# Patient Record
Sex: FEMALE | Race: WHITE | Employment: OTHER | URBAN - METROPOLITAN AREA
[De-identification: names, ages, dates, MRNs, and addresses within clinical notes are randomized per-mention and may not be internally consistent; named-entity substitution may affect disease eponyms.]

---

## 2017-12-01 ENCOUNTER — TRANSFERRED RECORDS (OUTPATIENT)
Dept: HEALTH INFORMATION MANAGEMENT | Facility: CLINIC | Age: 67
End: 2017-12-01

## 2018-06-13 ENCOUNTER — OFFICE VISIT (OUTPATIENT)
Dept: INTERNAL MEDICINE | Facility: CLINIC | Age: 68
End: 2018-06-13
Payer: MEDICARE

## 2018-06-13 VITALS
RESPIRATION RATE: 16 BRPM | HEART RATE: 86 BPM | WEIGHT: 217.9 LBS | HEIGHT: 60 IN | DIASTOLIC BLOOD PRESSURE: 76 MMHG | OXYGEN SATURATION: 96 % | BODY MASS INDEX: 42.78 KG/M2 | TEMPERATURE: 98.4 F | SYSTOLIC BLOOD PRESSURE: 104 MMHG

## 2018-06-13 DIAGNOSIS — I10 ESSENTIAL HYPERTENSION: Primary | ICD-10-CM

## 2018-06-13 LAB
ERYTHROCYTE [DISTWIDTH] IN BLOOD BY AUTOMATED COUNT: 13.5 % (ref 10–15)
HCT VFR BLD AUTO: 38.2 % (ref 35–47)
HGB BLD-MCNC: 12.4 G/DL (ref 11.7–15.7)
MCH RBC QN AUTO: 31 PG (ref 26.5–33)
MCHC RBC AUTO-ENTMCNC: 32.5 G/DL (ref 31.5–36.5)
MCV RBC AUTO: 96 FL (ref 78–100)
PLATELET # BLD AUTO: 258 10E9/L (ref 150–450)
RBC # BLD AUTO: 4 10E12/L (ref 3.8–5.2)
WBC # BLD AUTO: 6.9 10E9/L (ref 4–11)

## 2018-06-13 PROCEDURE — 85027 COMPLETE CBC AUTOMATED: CPT | Performed by: NURSE PRACTITIONER

## 2018-06-13 PROCEDURE — 82043 UR ALBUMIN QUANTITATIVE: CPT | Performed by: NURSE PRACTITIONER

## 2018-06-13 PROCEDURE — 80048 BASIC METABOLIC PNL TOTAL CA: CPT | Performed by: NURSE PRACTITIONER

## 2018-06-13 PROCEDURE — 80061 LIPID PANEL: CPT | Performed by: NURSE PRACTITIONER

## 2018-06-13 PROCEDURE — 99203 OFFICE O/P NEW LOW 30 MIN: CPT | Performed by: NURSE PRACTITIONER

## 2018-06-13 PROCEDURE — 36415 COLL VENOUS BLD VENIPUNCTURE: CPT | Performed by: NURSE PRACTITIONER

## 2018-06-13 PROCEDURE — 84460 ALANINE AMINO (ALT) (SGPT): CPT | Performed by: NURSE PRACTITIONER

## 2018-06-13 RX ORDER — MULTIPLE VITAMINS W/ MINERALS TAB 9MG-400MCG
1 TAB ORAL DAILY
COMMUNITY

## 2018-06-13 RX ORDER — POTASSIUM CHLORIDE 750 MG/1
10 TABLET, EXTENDED RELEASE ORAL DAILY
COMMUNITY

## 2018-06-13 RX ORDER — VALSARTAN AND HYDROCHLOROTHIAZIDE 160; 12.5 MG/1; MG/1
1 TABLET, FILM COATED ORAL 2 TIMES DAILY
Qty: 180 TABLET | Refills: 3 | Status: SHIPPED | OUTPATIENT
Start: 2018-06-13 | End: 2018-06-21

## 2018-06-13 RX ORDER — MONTELUKAST SODIUM 10 MG/1
10 TABLET ORAL DAILY
COMMUNITY
End: 2019-01-17

## 2018-06-13 RX ORDER — CHOLECALCIFEROL (VITAMIN D3) 25 MCG
2000 CAPSULE ORAL DAILY
COMMUNITY

## 2018-06-13 RX ORDER — METOPROLOL TARTRATE 50 MG
50 TABLET ORAL 2 TIMES DAILY
Qty: 180 TABLET | Refills: 3 | Status: SHIPPED | OUTPATIENT
Start: 2018-06-13 | End: 2019-01-17

## 2018-06-13 RX ORDER — CHLORAL HYDRATE 500 MG
2 CAPSULE ORAL DAILY
COMMUNITY

## 2018-06-13 RX ORDER — VALSARTAN AND HYDROCHLOROTHIAZIDE 160; 12.5 MG/1; MG/1
1 TABLET, FILM COATED ORAL 2 TIMES DAILY
COMMUNITY
End: 2018-06-13

## 2018-06-13 NOTE — LETTER
June 14, 2018      Yaz Rendon  [id number] [address]        Dear ,    We are writing to inform you of your test results.    Your cholesterol is mildly elevated. I would like you to work harder on your diet for now. You will need a follow up fasting cholesterol in 6-12 months. The rest of your results are in normal range.     Resulted Orders   CBC with platelets   Result Value Ref Range    WBC 6.9 4.0 - 11.0 10e9/L    RBC Count 4.00 3.8 - 5.2 10e12/L    Hemoglobin 12.4 11.7 - 15.7 g/dL    Hematocrit 38.2 35.0 - 47.0 %    MCV 96 78 - 100 fl    MCH 31.0 26.5 - 33.0 pg    MCHC 32.5 31.5 - 36.5 g/dL    RDW 13.5 10.0 - 15.0 %    Platelet Count 258 150 - 450 10e9/L   Basic metabolic panel   Result Value Ref Range    Sodium 143 133 - 144 mmol/L    Potassium 4.3 3.4 - 5.3 mmol/L    Chloride 106 94 - 109 mmol/L    Carbon Dioxide 27 20 - 32 mmol/L    Anion Gap 10 3 - 14 mmol/L    Glucose 89 70 - 99 mg/dL      Comment:      Fasting specimen    Urea Nitrogen 19 7 - 30 mg/dL    Creatinine 0.69 0.52 - 1.04 mg/dL    GFR Estimate 84 >60 mL/min/1.7m2      Comment:      Non  GFR Calc    GFR Estimate If Black >90 >60 mL/min/1.7m2      Comment:       GFR Calc    Calcium 9.4 8.5 - 10.1 mg/dL   ALT   Result Value Ref Range    ALT 26 0 - 50 U/L   Lipid Profile   Result Value Ref Range    Cholesterol 192 <200 mg/dL    Triglycerides 93 <150 mg/dL      Comment:      Fasting specimen    HDL Cholesterol 55 >49 mg/dL    LDL Cholesterol Calculated 118 (H) <100 mg/dL      Comment:      Above desirable:  100-129 mg/dl  Borderline High:  130-159 mg/dL  High:             160-189 mg/dL  Very high:       >189 mg/dl      Non HDL Cholesterol 137 (H) <130 mg/dL      Comment:      Above Desirable:  130-159 mg/dl  Borderline high:  160-189 mg/dl  High:             190-219 mg/dl  Very high:       >219 mg/dl         If you have any questions or concerns, please call the clinic at the  number listed above.       Sincerely,        Wendy Hicks NP

## 2018-06-13 NOTE — Clinical Note
Please abstract the following data from this visit with this patient into the appropriate field in Epic:  Colonoscopy done on this date: 02/01/17 (approximately), by this group: [place], results were normal.  Mammogram done on this date: 12/01/17 (approximately), by this group: [place], results were normal.  Dexa done in [place] and were normal.

## 2018-06-13 NOTE — MR AVS SNAPSHOT
"              After Visit Summary   2018    MS Yaz Rendon    MRN: [id number]           Patient Information     Date Of Birth          1950        Visit Information        Provider Department      2018 8:40 AM Wendy Hicks NP [facility]        Today's Diagnoses     Essential hypertension    -  1       Follow-ups after your visit        Who to contact     If you have questions or need follow up information about today's clinic visit or your schedule please contact [facility] directly at [phone].  Normal or non-critical lab and imaging results will be communicated to you by [employer], letter or phone within 4 business days after the clinic has received the results. If you do not hear from us within 7 days, please contact the clinic through [employer] or phone. If you have a critical or abnormal lab result, we will notify you by phone as soon as possible.  Submit refill requests through [employer] or call your pharmacy and they will forward the refill request to us. Please allow 3 business days for your refill to be completed.          Additional Information About Your Visit        MyChart Information     [employer] lets you send messages to your doctor, view your test results, renew your prescriptions, schedule appointments and more. To sign up, go to [email or web address] . Click on \"Log in\" on the left side of the screen, which will take you to the Welcome page. Then click on \"Sign up Now\" on the right side of the page.     You will be asked to enter the access code listed below, as well as some personal information. Please follow the directions to create your username and password.     Your access code is: [email or web address]  Expires: 2018  8:05 AM     Your access code will  in 90 days. If you need help or a new code, please call your [facility] or [phone].        Care EveryWhere ID     This is your Care EveryWhere ID. This could be used " by other organizations to access your [place] medical records  [id number]        Your Vitals Were     Pulse Temperature Respirations Height Pulse Oximetry BMI (Body Mass Index)    86 98.4  F (36.9  C) (Oral) 16 5' (1.524 m) 96% 42.56 kg/m2       Blood Pressure from Last 3 Encounters:   06/13/18 104/76    Weight from Last 3 Encounters:   06/13/18 217 lb 14.4 oz (98.8 kg)              We Performed the Following     Albumin Random Urine Quantitative with Creat Ratio     ALT     Basic metabolic panel     CBC with platelets     Lipid Profile          Today's Medication Changes          These changes are accurate as of 6/13/18  9:58 AM.  If you have any questions, ask your nurse or doctor.               Start taking these medicines.        Dose/Directions    metoprolol tartrate 50 MG tablet   Commonly known as:  LOPRESSOR   Used for:  Essential hypertension   Started by:  Wendy Hicks NP        Dose:  50 mg   Take 1 tablet (50 mg) by mouth 2 times daily   Quantity:  180 tablet   Refills:  3            Where to get your medicines      These medications were sent to [place] [address] [place] [address] [address]     Phone:  [phone]     metoprolol tartrate 50 MG tablet    valsartan-hydrochlorothiazide 160-12.5 MG per tablet                Primary Care Provider Office Phone # Fax #    Wendy Hicks [phone]       [address] [facility] [address]        Equal Access to Services     JOSELIN ALMONTE AH: Hadii aad ku hadasho Soomaali, waaxda luqadaha, qaybta kaalmada adeegyada, wax donte yeung adematias lopes. So [facility] [phone].    ATENCIÓN: Si habla español, tiene a blanchard disposición servicios gratuitos de asistencia lingüística. Lldanny al [phone].    We comply with applicable federal civil rights laws and [place] laws. We do not discriminate on the basis of race, color, national origin, age, disability,  sex, sexual orientation, or gender identity.            Thank you!     Thank you for choosing [facility]  for your care. Our goal is always to provide you with excellent care. Hearing back from our patients is one way we can continue to improve our services. Please take a few minutes to complete the written survey that you may receive in the mail after your visit with us. Thank you!             Your Updated Medication List - Protect others around you: Learn how to safely use, store and throw away your medicines at www.disposemymeds.org.          This list is accurate as of 6/13/18  9:58 AM.  Always use your most recent med list.                   Brand Name Dispense Instructions for use Diagnosis    aspirin 81 MG tablet      Take 81 mg by mouth daily        fish oil-omega-3 fatty acids 1000 MG capsule      Take 2 g by mouth daily        fluticasone 50 MCG/BLIST Aepb    FLOVENT DISKUS     Inhale 1 puff into the lungs daily        HYDROCHLOROTHIAZIDE PO      Take 25 mg by mouth daily        metoprolol tartrate 50 MG tablet    LOPRESSOR    180 tablet    Take 1 tablet (50 mg) by mouth 2 times daily    Essential hypertension       montelukast 10 MG tablet    SINGULAIR     Take 10 mg by mouth daily        Multi-vitamin Tabs tablet      Take 1 tablet by mouth daily        potassium chloride 10 MEQ tablet    K-TAB,KLOR-CON     Take 10 mEq by mouth daily        valsartan-hydrochlorothiazide 160-12.5 MG per tablet    DIOVAN-HCT    180 tablet    Take 1 tablet by mouth 2 times daily    Essential hypertension       Vitamin D-3 1000 units Caps      Take 2,000 Units by mouth daily

## 2018-06-14 LAB
ALT SERPL W P-5'-P-CCNC: 26 U/L (ref 0–50)
ANION GAP SERPL CALCULATED.3IONS-SCNC: 10 MMOL/L (ref 3–14)
BUN SERPL-MCNC: 19 MG/DL (ref 7–30)
CALCIUM SERPL-MCNC: 9.4 MG/DL (ref 8.5–10.1)
CHLORIDE SERPL-SCNC: 106 MMOL/L (ref 94–109)
CHOLEST SERPL-MCNC: 192 MG/DL
CO2 SERPL-SCNC: 27 MMOL/L (ref 20–32)
CREAT SERPL-MCNC: 0.69 MG/DL (ref 0.52–1.04)
GFR SERPL CREATININE-BSD FRML MDRD: 84 ML/MIN/1.7M2
GLUCOSE SERPL-MCNC: 89 MG/DL (ref 70–99)
HDLC SERPL-MCNC: 55 MG/DL
LDLC SERPL CALC-MCNC: 118 MG/DL
NONHDLC SERPL-MCNC: 137 MG/DL
POTASSIUM SERPL-SCNC: 4.3 MMOL/L (ref 3.4–5.3)
SODIUM SERPL-SCNC: 143 MMOL/L (ref 133–144)
TRIGL SERPL-MCNC: 93 MG/DL

## 2018-06-15 LAB
CREAT UR-MCNC: 231 MG/DL
MICROALBUMIN UR-MCNC: 17 MG/L
MICROALBUMIN/CREAT UR: 7.32 MG/G CR (ref 0–25)

## 2018-06-19 ENCOUNTER — DOCUMENTATION ONLY (OUTPATIENT)
Dept: OTHER | Facility: CLINIC | Age: 68
End: 2018-06-19

## 2018-06-19 PROBLEM — Z71.89 ADVANCED DIRECTIVES, COUNSELING/DISCUSSION: Chronic | Status: ACTIVE | Noted: 2018-06-19

## 2018-06-21 ENCOUNTER — TELEPHONE (OUTPATIENT)
Dept: INTERNAL MEDICINE | Facility: CLINIC | Age: 68
End: 2018-06-21

## 2018-06-21 DIAGNOSIS — I10 ESSENTIAL HYPERTENSION: ICD-10-CM

## 2018-06-21 RX ORDER — VALSARTAN AND HYDROCHLOROTHIAZIDE 160; 12.5 MG/1; MG/1
1 TABLET, FILM COATED ORAL DAILY
Qty: 90 TABLET | Refills: 3 | Status: SHIPPED | OUTPATIENT
Start: 2018-06-21 | End: 2019-01-17 | Stop reason: DRUGHIGH

## 2018-06-21 RX ORDER — VALSARTAN AND HYDROCHLOROTHIAZIDE 160; 12.5 MG/1; MG/1
1 TABLET, FILM COATED ORAL 2 TIMES DAILY
Qty: 90 TABLET | Refills: 3 | Status: SHIPPED | OUTPATIENT
Start: 2018-06-21 | End: 2018-06-21

## 2018-06-21 NOTE — TELEPHONE ENCOUNTER
Pharm called. Stated rx still says bid. Relayed it should qd. I faxed over new rx with correct directions

## 2018-06-21 NOTE — TELEPHONE ENCOUNTER
BID dosing was per pt report, no past med recs available.  Change diovan HCTZ 160/12.5 once daily  Wendy Hicks CNP

## 2018-06-21 NOTE — TELEPHONE ENCOUNTER
Melissa with [facility] calling.  States insur will only cover 1 tab daily of Diovan /12.5mg.    Need to either do a PA or change dose.    Please advise, thanks.

## 2018-07-23 ENCOUNTER — TELEPHONE (OUTPATIENT)
Dept: INTERNAL MEDICINE | Facility: CLINIC | Age: 68
End: 2018-07-23

## 2018-07-23 DIAGNOSIS — I10 ESSENTIAL HYPERTENSION: ICD-10-CM

## 2018-07-23 RX ORDER — VALSARTAN AND HYDROCHLOROTHIAZIDE 320; 25 MG/1; MG/1
1 TABLET, FILM COATED ORAL DAILY
Qty: 90 TABLET | Refills: 1 | Status: SHIPPED | OUTPATIENT
Start: 2018-07-23 | End: 2019-01-17

## 2018-07-23 NOTE — TELEPHONE ENCOUNTER
Liset, Pharmacist with [facility] calls regarding dose of Valsartan-hydrochlorothiazide. Insurance only covers once daily dosing and they received new prescription from Wendy for Valsartan-hydrochlorothiazide 160-12.5 mg take 1 tab daily. However, patient was previously taking 1 tab BID at this dose (pharmacy confirmed this with patient) and she asks if they get new order for the Valsartan-hydrochlorothiazide 320-25 mg tab, take 1 tab daily so patient is not decreasing her dose from before.     Order pended and sent to provider to review.

## 2019-01-04 DIAGNOSIS — I10 ESSENTIAL HYPERTENSION: ICD-10-CM

## 2019-01-04 NOTE — TELEPHONE ENCOUNTER
Pt is calling stating she needs this refilled because she will be out of town Monday.  Ph# [phone]-ok to lm.

## 2019-01-05 RX ORDER — METOPROLOL TARTRATE 50 MG
50 TABLET ORAL 2 TIMES DAILY
Qty: 180 TABLET | Refills: 0 | OUTPATIENT
Start: 2019-01-05

## 2019-01-05 NOTE — TELEPHONE ENCOUNTER
"Refills available at requesting pharmacy.    Med refused.    Please call to ask patient to contact her pharmacy for a refill.    Thank you!  ARA Blanco, RN        Requested Prescriptions   Pending Prescriptions Disp Refills     metoprolol tartrate (LOPRESSOR) 50 MG tablet [Pharmacy Med Name: METOPROLOL TARTRATE 50 MG TAB] 180 tablet 0     Sig: TAKE 1 TABLET (50 MG) BY MOUTH 2 TIMES DAILY    Beta-Blockers Protocol Passed - 1/5/2019  5:57 PM       Passed - Blood pressure under 140/90 in past 12 months    BP Readings from Last 3 Encounters:   06/13/18 104/76                Passed - Patient is age 6 or older       Passed - Recent (12 mo) or future (30 days) visit within the authorizing provider's specialty    Patient had office visit in the last 12 months or has a visit in the next 30 days with authorizing provider or within the authorizing provider's specialty.  See \"Patient Info\" tab in inbasket, or \"Choose Columns\" in Meds & Orders section of the refill encounter.             Passed - Medication is active on med list          "

## 2019-01-09 NOTE — TELEPHONE ENCOUNTER
Pt states she takes 50 mg every day or 25mg BID. Pt has enough med at this time. Appt scheduled for next week. Pharmacy notified of no need for this refill.   SILVIA Gunderson

## 2019-01-17 ENCOUNTER — OFFICE VISIT (OUTPATIENT)
Dept: INTERNAL MEDICINE | Facility: CLINIC | Age: 69
End: 2019-01-17
Payer: MEDICARE

## 2019-01-17 VITALS
SYSTOLIC BLOOD PRESSURE: 108 MMHG | TEMPERATURE: 97.5 F | DIASTOLIC BLOOD PRESSURE: 66 MMHG | HEIGHT: 60 IN | RESPIRATION RATE: 16 BRPM | WEIGHT: 223.2 LBS | BODY MASS INDEX: 43.82 KG/M2 | OXYGEN SATURATION: 94 % | HEART RATE: 82 BPM

## 2019-01-17 DIAGNOSIS — I10 ESSENTIAL HYPERTENSION: ICD-10-CM

## 2019-01-17 DIAGNOSIS — J30.2 SEASONAL ALLERGIC RHINITIS, UNSPECIFIED TRIGGER: Primary | ICD-10-CM

## 2019-01-17 LAB
ALT SERPL W P-5'-P-CCNC: 23 U/L (ref 0–50)
ANION GAP SERPL CALCULATED.3IONS-SCNC: 9 MMOL/L (ref 3–14)
BUN SERPL-MCNC: 18 MG/DL (ref 7–30)
CALCIUM SERPL-MCNC: 9.1 MG/DL (ref 8.5–10.1)
CHLORIDE SERPL-SCNC: 104 MMOL/L (ref 94–109)
CHOLEST SERPL-MCNC: 198 MG/DL
CO2 SERPL-SCNC: 27 MMOL/L (ref 20–32)
CREAT SERPL-MCNC: 0.75 MG/DL (ref 0.52–1.04)
ERYTHROCYTE [DISTWIDTH] IN BLOOD BY AUTOMATED COUNT: 12.9 % (ref 10–15)
GFR SERPL CREATININE-BSD FRML MDRD: 80 ML/MIN/{1.73_M2}
GLUCOSE SERPL-MCNC: 92 MG/DL (ref 70–99)
HCT VFR BLD AUTO: 39.8 % (ref 35–47)
HDLC SERPL-MCNC: 54 MG/DL
HGB BLD-MCNC: 13.1 G/DL (ref 11.7–15.7)
LDLC SERPL CALC-MCNC: 121 MG/DL
MCH RBC QN AUTO: 31.2 PG (ref 26.5–33)
MCHC RBC AUTO-ENTMCNC: 32.9 G/DL (ref 31.5–36.5)
MCV RBC AUTO: 95 FL (ref 78–100)
NONHDLC SERPL-MCNC: 144 MG/DL
PLATELET # BLD AUTO: 279 10E9/L (ref 150–450)
POTASSIUM SERPL-SCNC: 3.3 MMOL/L (ref 3.4–5.3)
RBC # BLD AUTO: 4.2 10E12/L (ref 3.8–5.2)
SODIUM SERPL-SCNC: 140 MMOL/L (ref 133–144)
TRIGL SERPL-MCNC: 114 MG/DL
WBC # BLD AUTO: 7.2 10E9/L (ref 4–11)

## 2019-01-17 PROCEDURE — 36415 COLL VENOUS BLD VENIPUNCTURE: CPT | Performed by: NURSE PRACTITIONER

## 2019-01-17 PROCEDURE — 85027 COMPLETE CBC AUTOMATED: CPT | Performed by: NURSE PRACTITIONER

## 2019-01-17 PROCEDURE — 99213 OFFICE O/P EST LOW 20 MIN: CPT | Performed by: NURSE PRACTITIONER

## 2019-01-17 PROCEDURE — 80048 BASIC METABOLIC PNL TOTAL CA: CPT | Performed by: NURSE PRACTITIONER

## 2019-01-17 PROCEDURE — 84460 ALANINE AMINO (ALT) (SGPT): CPT | Performed by: NURSE PRACTITIONER

## 2019-01-17 PROCEDURE — 80061 LIPID PANEL: CPT | Performed by: NURSE PRACTITIONER

## 2019-01-17 RX ORDER — METOPROLOL TARTRATE 50 MG
50 TABLET ORAL 2 TIMES DAILY
Qty: 180 TABLET | Refills: 3 | Status: SHIPPED | OUTPATIENT
Start: 2019-01-17 | End: 2019-12-18

## 2019-01-17 RX ORDER — VALSARTAN AND HYDROCHLOROTHIAZIDE 320; 25 MG/1; MG/1
1 TABLET, FILM COATED ORAL DAILY
Qty: 90 TABLET | Refills: 3 | Status: SHIPPED | OUTPATIENT
Start: 2019-01-17 | End: 2019-12-18

## 2019-01-17 RX ORDER — MONTELUKAST SODIUM 10 MG/1
10 TABLET ORAL DAILY
Qty: 90 TABLET | Refills: 3 | Status: SHIPPED | OUTPATIENT
Start: 2019-01-17 | End: 2022-02-07

## 2019-01-17 ASSESSMENT — MIFFLIN-ST. JEOR: SCORE: 1453.93

## 2019-01-17 NOTE — LETTER
January 17, 2019      Yaz Rendon  [address] [address]   [place] [address]        Dear ,    We are writing to inform you of your test results.    Your test results fall within the expected range(s) or remain unchanged from previous results.  Please continue with current treatment plan.    Resulted Orders   CBC with platelets   Result Value Ref Range    WBC 7.2 4.0 - 11.0 10e9/L    RBC Count 4.20 3.8 - 5.2 10e12/L    Hemoglobin 13.1 11.7 - 15.7 g/dL    Hematocrit 39.8 35.0 - 47.0 %    MCV 95 78 - 100 fl    MCH 31.2 26.5 - 33.0 pg    MCHC 32.9 31.5 - 36.5 g/dL    RDW 12.9 10.0 - 15.0 %    Platelet Count 279 150 - 450 10e9/L   Basic metabolic panel   Result Value Ref Range    Sodium 140 133 - 144 mmol/L    Potassium 3.3 (L) 3.4 - 5.3 mmol/L    Chloride 104 94 - 109 mmol/L    Carbon Dioxide 27 20 - 32 mmol/L    Anion Gap 9 3 - 14 mmol/L    Glucose 92 70 - 99 mg/dL      Comment:      Fasting specimen    Urea Nitrogen 18 7 - 30 mg/dL    Creatinine 0.75 0.52 - 1.04 mg/dL    GFR Estimate 80 >60 mL/min/[1.73_m2]      Comment:      Non  GFR Calc  Starting 12/18/2018, serum creatinine based estimated GFR (eGFR) will be   calculated using the Chronic Kidney Disease Epidemiology Collaboration   (CKD-EPI) equation.      GFR Estimate If Black >90 >60 mL/min/[1.73_m2]      Comment:       GFR Calc  Starting 12/18/2018, serum creatinine based estimated GFR (eGFR) will be   calculated using the Chronic Kidney Disease Epidemiology Collaboration   (CKD-EPI) equation.      Calcium 9.1 8.5 - 10.1 mg/dL   ALT   Result Value Ref Range    ALT 23 0 - 50 U/L   Lipid Profile   Result Value Ref Range    Cholesterol 198 <200 mg/dL    Triglycerides 114 <150 mg/dL      Comment:      Fasting specimen    HDL Cholesterol 54 >49 mg/dL    LDL Cholesterol Calculated 121 (H) <100 mg/dL      Comment:      Above desirable:  100-129 mg/dl  Borderline High:  130-159 mg/dL  High:             160-189  mg/dL  Very high:       >189 mg/dl      Non HDL Cholesterol 144 (H) <130 mg/dL      Comment:      Above Desirable:  130-159 mg/dl  Borderline high:  160-189 mg/dl  High:             190-219 mg/dl  Very high:       >219 mg/dl         If you have any questions or concerns, please call the clinic at the number listed above.       Sincerely,        Wendy Hicks NP

## 2019-01-17 NOTE — PROGRESS NOTES
SUBJECTIVE:   Yaz Rendon is a 68 year old female who presents to clinic today for the following health issues:      Hypertension Follow-up      Outpatient blood pressures are not being checked.    Low Salt Diet: no added salt      Amount of exercise or physical activity: 6-7 days/week for an average of 45-60 minutes    Problems taking medications regularly: No    Medication side effects: none, confused about metoprolol dosing    Diet: regular (no restrictions)            Problem list and histories reviewed & adjusted, as indicated.  Additional history: as documented    Patient Active Problem List   Diagnosis     Hypertension     Advance Care Planning     Past Surgical History:   Procedure Laterality Date     ABDOMEN SURGERY      2 c-sections     ORTHOPEDIC SURGERY      bilateral knee arthroscopic       Social History     Tobacco Use     Smoking status: Former Smoker     Smokeless tobacco: Never Used   Substance Use Topics     Alcohol use: Yes     Comment: occasional     Family History   Problem Relation Age of Onset     Dementia Mother      Hypertension Father      Coronary Artery Disease Early Onset Brother          Current Outpatient Medications   Medication Sig Dispense Refill     aspirin 81 MG tablet Take 81 mg by mouth daily       Cholecalciferol (VITAMIN D-3) 1000 units CAPS Take 2,000 Units by mouth daily       fish oil-omega-3 fatty acids 1000 MG capsule Take 2 g by mouth daily       fluticasone (FLOVENT DISKUS) 50 MCG/BLIST AEPB Inhale 1 puff into the lungs daily       metoprolol tartrate (LOPRESSOR) 50 MG tablet Take 1 tablet (50 mg) by mouth 2 times daily 180 tablet 3     montelukast (SINGULAIR) 10 MG tablet Take 1 tablet (10 mg) by mouth daily 90 tablet 3     multivitamin, therapeutic with minerals (MULTI-VITAMIN) TABS tablet Take 1 tablet by mouth daily       potassium chloride (K-TAB,KLOR-CON) 10 MEQ tablet Take 10 mEq by mouth daily       valsartan-hydrochlorothiazide (DIOVAN HCT) 320-25  MG tablet Take 1 tablet by mouth daily 90 tablet 3     BP Readings from Last 3 Encounters:   01/17/19 108/66   06/13/18 104/76    Wt Readings from Last 3 Encounters:   01/17/19 101.2 kg (223 lb 3.2 oz)   06/13/18 98.8 kg (217 lb 14.4 oz)                    Reviewed and updated as needed this visit by clinical staff  Tobacco  Allergies  Meds  Med Hx  Surg Hx  Fam Hx  Soc Hx      Reviewed and updated as needed this visit by Provider         ROS:  Constitutional, HEENT, cardiovascular, pulmonary, gi and gu systems are negative, except as otherwise noted.    OBJECTIVE:     /66 (BP Location: Right arm, Patient Position: Sitting, Cuff Size: Adult Large)   Pulse 82   Temp 97.5  F (36.4  C) (Oral)   Resp 16   Ht 1.524 m (5')   Wt 101.2 kg (223 lb 3.2 oz)   SpO2 94%   BMI 43.59 kg/m    Body mass index is 43.59 kg/m .  GENERAL: alert, no distress and obese  RESP: lungs clear to auscultation - no rales, rhonchi or wheezes  CV: regular rate and rhythm, normal S1 S2, no S3 or S4, no murmur, click or rub, no peripheral edema and peripheral pulses strong        ASSESSMENT/PLAN:               ICD-10-CM    1. Seasonal allergic rhinitis, unspecified trigger J30.2 montelukast (SINGULAIR) 10 MG tablet   2. Essential hypertension I10 metoprolol tartrate (LOPRESSOR) 50 MG tablet     valsartan-hydrochlorothiazide (DIOVAN HCT) 320-25 MG tablet     CBC with platelets     Basic metabolic panel     ALT     Lipid Profile       F/u 6 months    Wendy Hicks NP  [facility]

## 2019-01-21 ENCOUNTER — DOCUMENTATION ONLY (OUTPATIENT)
Dept: OTHER | Facility: CLINIC | Age: 69
End: 2019-01-21

## 2019-01-21 PROBLEM — Z71.89 ADVANCED DIRECTIVES, COUNSELING/DISCUSSION: Chronic | Status: RESOLVED | Noted: 2018-06-19 | Resolved: 2019-01-21

## 2019-07-22 ENCOUNTER — OFFICE VISIT (OUTPATIENT)
Dept: INTERNAL MEDICINE | Facility: CLINIC | Age: 69
End: 2019-07-22
Payer: MEDICARE

## 2019-07-22 VITALS
SYSTOLIC BLOOD PRESSURE: 120 MMHG | WEIGHT: 225.1 LBS | TEMPERATURE: 98.3 F | RESPIRATION RATE: 18 BRPM | BODY MASS INDEX: 44.19 KG/M2 | HEART RATE: 79 BPM | HEIGHT: 60 IN | DIASTOLIC BLOOD PRESSURE: 69 MMHG | OXYGEN SATURATION: 98 %

## 2019-07-22 DIAGNOSIS — E78.00 HIGH CHOLESTEROL: ICD-10-CM

## 2019-07-22 DIAGNOSIS — I10 ESSENTIAL HYPERTENSION: Primary | ICD-10-CM

## 2019-07-22 DIAGNOSIS — E66.01 MORBID OBESITY (H): ICD-10-CM

## 2019-07-22 LAB
ALT SERPL W P-5'-P-CCNC: 31 U/L (ref 0–50)
ANION GAP SERPL CALCULATED.3IONS-SCNC: 3 MMOL/L (ref 3–14)
BUN SERPL-MCNC: 19 MG/DL (ref 7–30)
CALCIUM SERPL-MCNC: 9.2 MG/DL (ref 8.5–10.1)
CHLORIDE SERPL-SCNC: 107 MMOL/L (ref 94–109)
CHOLEST SERPL-MCNC: 192 MG/DL
CO2 SERPL-SCNC: 31 MMOL/L (ref 20–32)
CREAT SERPL-MCNC: 0.66 MG/DL (ref 0.52–1.04)
ERYTHROCYTE [DISTWIDTH] IN BLOOD BY AUTOMATED COUNT: 13.2 % (ref 10–15)
GFR SERPL CREATININE-BSD FRML MDRD: 89 ML/MIN/{1.73_M2}
GLUCOSE SERPL-MCNC: 88 MG/DL (ref 70–99)
HCT VFR BLD AUTO: 38 % (ref 35–47)
HDLC SERPL-MCNC: 58 MG/DL
HGB BLD-MCNC: 12.3 G/DL (ref 11.7–15.7)
LDLC SERPL CALC-MCNC: 105 MG/DL
MCH RBC QN AUTO: 30.9 PG (ref 26.5–33)
MCHC RBC AUTO-ENTMCNC: 32.4 G/DL (ref 31.5–36.5)
MCV RBC AUTO: 96 FL (ref 78–100)
NONHDLC SERPL-MCNC: 134 MG/DL
PLATELET # BLD AUTO: 254 10E9/L (ref 150–450)
POTASSIUM SERPL-SCNC: 4.2 MMOL/L (ref 3.4–5.3)
RBC # BLD AUTO: 3.98 10E12/L (ref 3.8–5.2)
SODIUM SERPL-SCNC: 141 MMOL/L (ref 133–144)
TRIGL SERPL-MCNC: 147 MG/DL
TSH SERPL DL<=0.005 MIU/L-ACNC: 3.14 MU/L (ref 0.4–4)
WBC # BLD AUTO: 6.8 10E9/L (ref 4–11)

## 2019-07-22 PROCEDURE — 82043 UR ALBUMIN QUANTITATIVE: CPT | Performed by: NURSE PRACTITIONER

## 2019-07-22 PROCEDURE — 84460 ALANINE AMINO (ALT) (SGPT): CPT | Performed by: NURSE PRACTITIONER

## 2019-07-22 PROCEDURE — 85027 COMPLETE CBC AUTOMATED: CPT | Performed by: NURSE PRACTITIONER

## 2019-07-22 PROCEDURE — 80061 LIPID PANEL: CPT | Performed by: NURSE PRACTITIONER

## 2019-07-22 PROCEDURE — 36415 COLL VENOUS BLD VENIPUNCTURE: CPT | Performed by: NURSE PRACTITIONER

## 2019-07-22 PROCEDURE — 80048 BASIC METABOLIC PNL TOTAL CA: CPT | Performed by: NURSE PRACTITIONER

## 2019-07-22 PROCEDURE — 84443 ASSAY THYROID STIM HORMONE: CPT | Performed by: NURSE PRACTITIONER

## 2019-07-22 PROCEDURE — 99213 OFFICE O/P EST LOW 20 MIN: CPT | Performed by: NURSE PRACTITIONER

## 2019-07-22 ASSESSMENT — MIFFLIN-ST. JEOR: SCORE: 1457.55

## 2019-07-22 NOTE — NURSING NOTE
/69 (BP Location: Right arm, Patient Position: Sitting, Cuff Size: Adult Regular)   Pulse 79   Temp 98.3  F (36.8  C) (Oral)   Resp 18   Ht 1.524 m (5')   Wt 102.1 kg (225 lb 1.6 oz)   SpO2 98%   BMI 43.96 kg/m    Patient is here for a follow up.

## 2019-07-22 NOTE — PROGRESS NOTES
Subjective     Yaz Rendon is a 69 year old female who presents to clinic today for the following health issues:  Patient is here for a follow up.  HPI   Hypertension Follow-up      Do you check your blood pressure regularly outside of the clinic? No     Are you following a low salt diet? Yes    Are your blood pressures ever more than 140 on the top number (systolic) OR more   than 90 on the bottom number (diastolic), for example 140/90? Yes    Amount of exercise or physical activity: 6-7 days/week for an average of greater than 60 minutes    Problems taking medications regularly: No    Medication side effects: none    Diet: regular (no restrictions) and low salt          Patient Active Problem List   Diagnosis     Hypertension     Morbid obesity (H)     Past Surgical History:   Procedure Laterality Date     ABDOMEN SURGERY      2 c-sections     ORTHOPEDIC SURGERY      bilateral knee arthroscopic       Social History     Tobacco Use     Smoking status: Former Smoker     Smokeless tobacco: Never Used   Substance Use Topics     Alcohol use: Yes     Comment: occasional     Family History   Problem Relation Age of Onset     Dementia Mother      Hypertension Father      Coronary Artery Disease Early Onset Brother          Current Outpatient Medications   Medication Sig Dispense Refill     aspirin 81 MG tablet Take 81 mg by mouth daily       Cholecalciferol (VITAMIN D-3) 1000 units CAPS Take 2,000 Units by mouth daily       fish oil-omega-3 fatty acids 1000 MG capsule Take 2 g by mouth daily       fluticasone (FLOVENT DISKUS) 50 MCG/BLIST AEPB Inhale 1 puff into the lungs daily       metoprolol tartrate (LOPRESSOR) 50 MG tablet Take 1 tablet (50 mg) by mouth 2 times daily 180 tablet 3     montelukast (SINGULAIR) 10 MG tablet Take 1 tablet (10 mg) by mouth daily 90 tablet 3     multivitamin, therapeutic with minerals (MULTI-VITAMIN) TABS tablet Take 1 tablet by mouth daily       potassium chloride  (K-TAB,KLOR-CON) 10 MEQ tablet Take 10 mEq by mouth daily       valsartan-hydrochlorothiazide (DIOVAN HCT) 320-25 MG tablet Take 1 tablet by mouth daily 90 tablet 3     BP Readings from Last 3 Encounters:   07/22/19 120/69   01/17/19 108/66   06/13/18 104/76    Wt Readings from Last 3 Encounters:   07/22/19 102.1 kg (225 lb 1.6 oz)   01/17/19 101.2 kg (223 lb 3.2 oz)   06/13/18 98.8 kg (217 lb 14.4 oz)                      Reviewed and updated as needed this visit by Provider         Review of Systems   ROS COMP: CONSTITUTIONAL: NEGATIVE for fever, chills, change in weight  ENT/MOUTH: NEGATIVE for ear, mouth and throat problems  RESP: NEGATIVE for significant cough or SOB  CV: NEGATIVE for chest pain, palpitations or peripheral edema      Objective    /69 (BP Location: Right arm, Patient Position: Sitting, Cuff Size: Adult Regular)   Pulse 79   Temp 98.3  F (36.8  C) (Oral)   Resp 18   Ht 1.524 m (5')   Wt 102.1 kg (225 lb 1.6 oz)   SpO2 98%   BMI 43.96 kg/m    Body mass index is 43.96 kg/m .  Physical Exam   GENERAL: alert, no distress and obese  RESP: lungs clear to auscultation - no rales, rhonchi or wheezes  CV: regular rate and rhythm, normal S1 S2, no S3 or S4, no murmur, click or rub, no peripheral edema and peripheral pulses strong            Assessment & Plan       ICD-10-CM    1. Essential hypertension I10 CBC with platelets     Basic metabolic panel     TSH     Albumin Random Urine Quantitative with Creat Ratio   2. High cholesterol E78.00 ALT     Lipid Profile   3. Morbid obesity (H) E66.01         BMI:   Estimated body mass index is 43.96 kg/m  as calculated from the following:    Height as of this encounter: 1.524 m (5').    Weight as of this encounter: 102.1 kg (225 lb 1.6 oz).   Weight management plan: Discussed healthy diet and exercise guidelines        The current medical regimen is effective;  continue present plan and medications.  F/u 6 months      Wendy Hicks NP  [place]  [facility]

## 2019-07-23 LAB
CREAT UR-MCNC: 195 MG/DL
MICROALBUMIN UR-MCNC: 57 MG/L
MICROALBUMIN/CREAT UR: 29.03 MG/G CR (ref 0–25)

## 2019-08-19 ENCOUNTER — TRANSFERRED RECORDS (OUTPATIENT)
Dept: HEALTH INFORMATION MANAGEMENT | Facility: CLINIC | Age: 69
End: 2019-08-19

## 2019-12-18 ENCOUNTER — HOSPITAL ENCOUNTER (OUTPATIENT)
Dept: MAMMOGRAPHY | Facility: CLINIC | Age: 69
Discharge: HOME | End: 2019-12-18
Attending: NURSE PRACTITIONER | Admitting: NURSE PRACTITIONER
Payer: MEDICARE

## 2019-12-18 ENCOUNTER — OFFICE VISIT (OUTPATIENT)
Dept: INTERNAL MEDICINE | Facility: CLINIC | Age: 69
End: 2019-12-18
Payer: MEDICARE

## 2019-12-18 VITALS
OXYGEN SATURATION: 94 % | DIASTOLIC BLOOD PRESSURE: 70 MMHG | HEIGHT: 67 IN | RESPIRATION RATE: 16 BRPM | TEMPERATURE: 98 F | SYSTOLIC BLOOD PRESSURE: 122 MMHG | HEART RATE: 80 BPM | WEIGHT: 224.2 LBS | BODY MASS INDEX: 35.19 KG/M2

## 2019-12-18 DIAGNOSIS — I10 ESSENTIAL HYPERTENSION: ICD-10-CM

## 2019-12-18 DIAGNOSIS — Z00.00 HEALTHCARE MAINTENANCE: Primary | ICD-10-CM

## 2019-12-18 DIAGNOSIS — Z00.00 ENCOUNTER FOR MEDICARE ANNUAL WELLNESS EXAM: ICD-10-CM

## 2019-12-18 DIAGNOSIS — Z12.31 SCREENING MAMMOGRAM FOR HIGH-RISK PATIENT: ICD-10-CM

## 2019-12-18 LAB
ALT SERPL W P-5'-P-CCNC: 27 U/L (ref 0–50)
ANION GAP SERPL CALCULATED.3IONS-SCNC: 6 MMOL/L (ref 3–14)
BUN SERPL-MCNC: 15 MG/DL (ref 7–30)
CALCIUM SERPL-MCNC: 9.7 MG/DL (ref 8.5–10.1)
CHLORIDE SERPL-SCNC: 103 MMOL/L (ref 94–109)
CHOLEST SERPL-MCNC: 201 MG/DL
CO2 SERPL-SCNC: 30 MMOL/L (ref 20–32)
CREAT SERPL-MCNC: 0.63 MG/DL (ref 0.52–1.04)
ERYTHROCYTE [DISTWIDTH] IN BLOOD BY AUTOMATED COUNT: 12.7 % (ref 10–15)
GFR SERPL CREATININE-BSD FRML MDRD: 90 ML/MIN/{1.73_M2}
GLUCOSE SERPL-MCNC: 96 MG/DL (ref 70–99)
HCT VFR BLD AUTO: 39.5 % (ref 35–47)
HDLC SERPL-MCNC: 55 MG/DL
HGB BLD-MCNC: 12.9 G/DL (ref 11.7–15.7)
LDLC SERPL CALC-MCNC: 117 MG/DL
MCH RBC QN AUTO: 31.1 PG (ref 26.5–33)
MCHC RBC AUTO-ENTMCNC: 32.7 G/DL (ref 31.5–36.5)
MCV RBC AUTO: 95 FL (ref 78–100)
NONHDLC SERPL-MCNC: 146 MG/DL
PLATELET # BLD AUTO: 284 10E9/L (ref 150–450)
POTASSIUM SERPL-SCNC: 3.8 MMOL/L (ref 3.4–5.3)
RBC # BLD AUTO: 4.15 10E12/L (ref 3.8–5.2)
SODIUM SERPL-SCNC: 139 MMOL/L (ref 133–144)
TRIGL SERPL-MCNC: 145 MG/DL
TSH SERPL DL<=0.005 MIU/L-ACNC: 3.6 MU/L (ref 0.4–4)
WBC # BLD AUTO: 7.1 10E9/L (ref 4–11)

## 2019-12-18 PROCEDURE — 84443 ASSAY THYROID STIM HORMONE: CPT | Performed by: NURSE PRACTITIONER

## 2019-12-18 PROCEDURE — 80048 BASIC METABOLIC PNL TOTAL CA: CPT | Performed by: NURSE PRACTITIONER

## 2019-12-18 PROCEDURE — 36415 COLL VENOUS BLD VENIPUNCTURE: CPT | Performed by: NURSE PRACTITIONER

## 2019-12-18 PROCEDURE — 80061 LIPID PANEL: CPT | Performed by: NURSE PRACTITIONER

## 2019-12-18 PROCEDURE — 82043 UR ALBUMIN QUANTITATIVE: CPT | Performed by: NURSE PRACTITIONER

## 2019-12-18 PROCEDURE — 77063 BREAST TOMOSYNTHESIS BI: CPT

## 2019-12-18 PROCEDURE — 85027 COMPLETE CBC AUTOMATED: CPT | Performed by: NURSE PRACTITIONER

## 2019-12-18 PROCEDURE — G0439 PPPS, SUBSEQ VISIT: HCPCS | Performed by: NURSE PRACTITIONER

## 2019-12-18 PROCEDURE — 84460 ALANINE AMINO (ALT) (SGPT): CPT | Performed by: NURSE PRACTITIONER

## 2019-12-18 RX ORDER — METOPROLOL TARTRATE 50 MG
50 TABLET ORAL 2 TIMES DAILY
Qty: 180 TABLET | Refills: 3 | Status: SHIPPED | OUTPATIENT
Start: 2019-12-18 | End: 2020-07-07

## 2019-12-18 RX ORDER — VALSARTAN AND HYDROCHLOROTHIAZIDE 320; 25 MG/1; MG/1
1 TABLET, FILM COATED ORAL DAILY
Qty: 90 TABLET | Refills: 3 | Status: SHIPPED | OUTPATIENT
Start: 2019-12-18 | End: 2020-07-07

## 2019-12-18 ASSESSMENT — ENCOUNTER SYMPTOMS
ARTHRALGIAS: 0
DYSURIA: 0
BREAST MASS: 0
NAUSEA: 0
DIARRHEA: 0
MYALGIAS: 0
HEMATURIA: 0
PARESTHESIAS: 0
FEVER: 0
COUGH: 0
HEMATOCHEZIA: 0
NERVOUS/ANXIOUS: 0
JOINT SWELLING: 0
SHORTNESS OF BREATH: 0
DIZZINESS: 0
CONSTIPATION: 0
PALPITATIONS: 0
HEADACHES: 0
HEARTBURN: 0
CHILLS: 0
FREQUENCY: 0
ABDOMINAL PAIN: 0
EYE PAIN: 1
SORE THROAT: 0
WEAKNESS: 0

## 2019-12-18 ASSESSMENT — MIFFLIN-ST. JEOR: SCORE: 1564.59

## 2019-12-18 ASSESSMENT — ACTIVITIES OF DAILY LIVING (ADL): CURRENT_FUNCTION: NO ASSISTANCE NEEDED

## 2019-12-18 NOTE — PATIENT INSTRUCTIONS
Patient Education   Personalized Prevention Plan  You are due for the preventive services outlined below.  Your care team is available to assist you in scheduling these services.  If you have already completed any of these items, please share that information with your care team to update in your medical record.  Health Maintenance Due   Topic Date Due     Hepatitis C Screening  1950     Zoster (Shingles) Vaccine (1 of 2) 02/14/1998     Annual Wellness Visit  02/14/2013     Pneumococcal Vaccine (1 of 2 - PCV13) 02/14/2013     Flu Vaccine (1) 09/01/2019     Mammogram  12/01/2019

## 2019-12-18 NOTE — PROGRESS NOTES
"SUBJECTIVE:   Yaz Rendon is a 69 year old female who presents for Preventive Visit.  Fasting.  Are you in the first 12 months of your Medicare coverage?  No    Healthy Habits:     In general, how would you rate your overall health?  Good    Frequency of exercise:  6-7 days/week    Duration of exercise:  30-45 minutes    Do you usually eat at least 4 servings of fruit and vegetables a day, include whole grains    & fiber and avoid regularly eating high fat or \"junk\" foods?  Yes    Taking medications regularly:  Yes    Medication side effects:  None    Ability to successfully perform activities of daily living:  No assistance needed    Home Safety:  No safety concerns identified    Hearing Impairment:  No hearing concerns    In the past 6 months, have you been bothered by leaking of urine?  No    In general, how would you rate your overall mental or emotional health?  Excellent      PHQ-2 Total Score: 0    Additional concerns today:  No    Do you feel safe in your environment? Yes    Have you ever done Advance Care Planning? (For example, a Health Directive, POLST, or a discussion with a medical provider or your loved ones about your wishes): Yes, advance care planning is on file.    Fall risk  Fallen 2 or more times in the past year?: No  Any fall with injury in the past year?: No    Cognitive Screening   1) Repeat 3 items (Leader, Season, Table)    2) Clock draw: NORMAL  3) 3 item recall: Recalls 3 objects  Results: 3 items recalled: COGNITIVE IMPAIRMENT LESS LIKELY    Mini-CogTM Copyright TOI Figueroa. Licensed by the author for use in [facility]; reprinted with permission (van@.[facility]). All rights reserved.      Do you have sleep apnea, excessive snoring or daytime drowsiness?: no    Reviewed and updated as needed this visit by clinical staff  Tobacco  Allergies  Meds  Med Hx  Surg Hx  Fam Hx  Soc Hx        Reviewed and updated as needed this visit by Provider        Social History "     Tobacco Use     Smoking status: Former Smoker     Smokeless tobacco: Never Used   Substance Use Topics     Alcohol use: Yes     Comment: occasional     If you drink alcohol do you typically have >3 drinks per day or >7 drinks per week? No    Alcohol Use 12/18/2019   Prescreen: >3 drinks/day or >7 drinks/week? No       Current providers sharing in care for this patient include:   Patient Care Team:  Wendy Hicks NP as PCP - General (Nurse Practitioner - Adult Health)  Wendy Hicks NP as Assigned PCP    The following health maintenance items are reviewed in Epic and correct as of today:  Health Maintenance   Topic Date Due     HEPATITIS C SCREENING  1950     ZOSTER IMMUNIZATION (1 of 2) 02/14/1998     MEDICARE ANNUAL WELLNESS VISIT  02/14/2013     PNEUMOCOCCAL IMMUNIZATION 65+ LOW/MEDIUM RISK (1 of 2 - PCV13) 02/14/2013     INFLUENZA VACCINE (1) 09/01/2019     MAMMO SCREENING  12/01/2019     FALL RISK ASSESSMENT  07/22/2020     ADVANCE CARE PLANNING  01/21/2024     LIPID  07/22/2024     DTAP/TDAP/TD IMMUNIZATION (2 - Td) 09/15/2025     COLONOSCOPY  12/01/2027     DEXA  Completed     PHQ-2  Completed     IPV IMMUNIZATION  Aged Out     MENINGITIS IMMUNIZATION  Aged Out     BP Readings from Last 3 Encounters:   12/18/19 122/70   07/22/19 120/69   01/17/19 108/66    Wt Readings from Last 3 Encounters:   12/18/19 101.7 kg (224 lb 3.2 oz)   07/22/19 102.1 kg (225 lb 1.6 oz)   01/17/19 101.2 kg (223 lb 3.2 oz)                  Patient Active Problem List   Diagnosis     Hypertension     Morbid obesity (H)     Past Surgical History:   Procedure Laterality Date     ABDOMEN SURGERY      2 c-sections     ORTHOPEDIC SURGERY      bilateral knee arthroscopic       Social History     Tobacco Use     Smoking status: Former Smoker     Smokeless tobacco: Never Used   Substance Use Topics     Alcohol use: Yes     Comment: occasional     Family History   Problem Relation Age of Onset     Dementia Mother       Hypertension Father      Coronary Artery Disease Early Onset Brother          Current Outpatient Medications   Medication Sig Dispense Refill     aspirin 81 MG tablet Take 81 mg by mouth daily       Cholecalciferol (VITAMIN D-3) 1000 units CAPS Take 2,000 Units by mouth daily       fish oil-omega-3 fatty acids 1000 MG capsule Take 2 g by mouth daily       fluticasone (FLOVENT DISKUS) 50 MCG/BLIST AEPB Inhale 1 puff into the lungs daily       metoprolol tartrate (LOPRESSOR) 50 MG tablet Take 1 tablet (50 mg) by mouth 2 times daily 180 tablet 3     montelukast (SINGULAIR) 10 MG tablet Take 1 tablet (10 mg) by mouth daily 90 tablet 3     multivitamin, therapeutic with minerals (MULTI-VITAMIN) TABS tablet Take 1 tablet by mouth daily       potassium chloride (K-TAB,KLOR-CON) 10 MEQ tablet Take 10 mEq by mouth daily       valsartan-hydrochlorothiazide (DIOVAN HCT) 320-25 MG tablet Take 1 tablet by mouth daily 90 tablet 3     Mammogram Screening: Mammogram Screening: Patient over age 50, mutual decision to screen reflected in health maintenance.    Review of Systems   Constitutional: Negative for chills and fever.   HENT: Negative for congestion, ear pain, hearing loss and sore throat.    Eyes: Positive for pain and visual disturbance.   Respiratory: Negative for cough and shortness of breath.    Cardiovascular: Negative for chest pain, palpitations and peripheral edema.   Gastrointestinal: Negative for abdominal pain, constipation, diarrhea, heartburn, hematochezia and nausea.   Breasts:  Negative for breast mass and discharge.   Genitourinary: Negative for dysuria, frequency, genital sores, hematuria, pelvic pain, urgency, vaginal bleeding and vaginal discharge.   Musculoskeletal: Negative for arthralgias, joint swelling and myalgias.   Skin: Negative for rash.   Neurological: Negative for dizziness, weakness, headaches and paresthesias.   Psychiatric/Behavioral: Negative for mood changes. The patient is not  "nervous/anxious.      Constitutional, HEENT, cardiovascular, pulmonary, gi and gu systems are negative, except as otherwise noted.    OBJECTIVE:   /70 (BP Location: Right arm, Patient Position: Chair, Cuff Size: Adult Large)   Pulse 80   Temp 98  F (36.7  C) (Oral)   Resp 16   Ht 1.702 m (5' 7\")   Wt 101.7 kg (224 lb 3.2 oz)   SpO2 94%   Breastfeeding No   BMI 35.11 kg/m   Estimated body mass index is 35.11 kg/m  as calculated from the following:    Height as of this encounter: 1.702 m (5' 7\").    Weight as of this encounter: 101.7 kg (224 lb 3.2 oz).  Physical Exam  GENERAL: alert, no distress and obese  NECK: no adenopathy, no asymmetry, masses, or scars and thyroid normal to palpation  RESP: lungs clear to auscultation - no rales, rhonchi or wheezes  CV: regular rate and rhythm, normal S1 S2, no S3 or S4, no murmur, click or rub, no peripheral edema and peripheral pulses strong  ABDOMEN: soft, nontender, no hepatosplenomegaly, no masses and bowel sounds normal  MS: no gross musculoskeletal defects noted, no edema  PSYCH: mentation appears normal, affect normal/bright        ASSESSMENT / PLAN:       ICD-10-CM    1. Healthcare maintenance Z00.00 CBC with platelets     Basic metabolic panel     ALT     Lipid Profile     TSH with free T4 reflex     Albumin Random Urine Quantitative with Creat Ratio   2. Essential hypertension I10 metoprolol tartrate (LOPRESSOR) 50 MG tablet     valsartan-hydrochlorothiazide (DIOVAN HCT) 320-25 MG tablet       COUNSELING:  Reviewed preventive health counseling, as reflected in patient instructions    Estimated body mass index is 35.11 kg/m  as calculated from the following:    Height as of this encounter: 1.702 m (5' 7\").    Weight as of this encounter: 101.7 kg (224 lb 3.2 oz).         reports that she has quit smoking. She has never used smokeless tobacco.      Appropriate preventive services were discussed with this patient, including applicable screening as " appropriate for cardiovascular disease, diabetes, osteopenia/osteoporosis, and glaucoma.  As appropriate for age/gender, discussed screening for colorectal cancer, prostate cancer, breast cancer, and cervical cancer. Checklist reviewing preventive services available has been given to the patient.    Reviewed patients plan of care and provided an AVS. The Basic Care Plan (routine screening as documented in Health Maintenance) for Yaz meets the Care Plan requirement. This Care Plan has been established and reviewed with the Patient.    Counseling Resources:  ATP IV Guidelines  Pooled Cohorts Equation Calculator  Breast Cancer Risk Calculator  FRAX Risk Assessment  ICSI Preventive Guidelines  Dietary Guidelines for Americans, 2010  USDA's MyPlate  ASA Prophylaxis  Lung CA Screening    Wendy Hicks NP  [facility]    Identified Health Risks:

## 2019-12-19 LAB
CREAT UR-MCNC: 259 MG/DL
MICROALBUMIN UR-MCNC: 23 MG/L
MICROALBUMIN/CREAT UR: 8.88 MG/G CR (ref 0–25)

## 2020-07-07 ENCOUNTER — VIRTUAL VISIT (OUTPATIENT)
Dept: INTERNAL MEDICINE | Facility: CLINIC | Age: 70
End: 2020-07-07
Payer: MEDICARE

## 2020-07-07 DIAGNOSIS — I10 ESSENTIAL HYPERTENSION: ICD-10-CM

## 2020-07-07 PROCEDURE — 99441 ZZC PHYSICIAN TELEPHONE EVALUATION 5-10 MIN: CPT | Performed by: NURSE PRACTITIONER

## 2020-07-07 RX ORDER — VALSARTAN AND HYDROCHLOROTHIAZIDE 320; 25 MG/1; MG/1
1 TABLET, FILM COATED ORAL DAILY
Qty: 90 TABLET | Refills: 3 | Status: SHIPPED | OUTPATIENT
Start: 2020-07-07 | End: 2021-07-12

## 2020-07-07 RX ORDER — METOPROLOL TARTRATE 50 MG
50 TABLET ORAL 2 TIMES DAILY
Qty: 180 TABLET | Refills: 3 | Status: SHIPPED | OUTPATIENT
Start: 2020-07-07 | End: 2021-07-12

## 2020-07-07 NOTE — PROGRESS NOTES
"Yaz Rendon is a 70 year old female who is being evaluated via a billable telephone visit.      The patient has been notified of following:     \"This telephone visit will be conducted via a call between you and your physician/provider. We have found that certain health care needs can be provided without the need for a physical exam.  This service lets us provide the care you need with a short phone conversation.  If a prescription is necessary we can send it directly to your pharmacy.  If lab work is needed we can place an order for that and you can then stop by our lab to have the test done at a later time.    Telephone visits are billed at different rates depending on your insurance coverage. During this emergency period, for some insurers they may be billed the same as an in-person visit.  Please reach out to your insurance provider with any questions.    If during the course of the call the physician/provider feels a telephone visit is not appropriate, you will not be charged for this service.\"    Patient has given verbal consent for Telephone visit?  Yes    What phone number would you like to be contacted at? [phone]    How would you like to obtain your AVS? Davin Sawant     Yaz Rendon is a 70 year old female who presents via phone visit today for the following health issues:    HPI  Hypertension Follow-up      Do you check your blood pressure regularly outside of the clinic? No     Are you following a low salt diet? Yes    Are your blood pressures ever more than 140 on the top number (systolic) OR more   than 90 on the bottom number (diastolic), for example 140/90? No      How many servings of fruits and vegetables do you eat daily?  4 or more    On average, how many sweetened beverages do you drink each day (Examples: soda, juice, sweet tea, etc.  Do NOT count diet or artificially sweetened beverages)?   0    How many days per week do you exercise enough to make your heart " beat faster? 7    How many minutes a day do you exercise enough to make your heart beat faster? 30 - 60    How many days per week do you miss taking your medication? 0             Patient Active Problem List   Diagnosis     Hypertension     Morbid obesity (H)     Past Surgical History:   Procedure Laterality Date     ABDOMEN SURGERY      2 c-sections     ORTHOPEDIC SURGERY      bilateral knee arthroscopic       Social History     Tobacco Use     Smoking status: Former Smoker     Smokeless tobacco: Never Used   Substance Use Topics     Alcohol use: Yes     Comment: occasional     Family History   Problem Relation Age of Onset     Dementia Mother      Hypertension Father      Coronary Artery Disease Early Onset Brother          Current Outpatient Medications   Medication Sig Dispense Refill     aspirin 81 MG tablet Take 81 mg by mouth daily       Cholecalciferol (VITAMIN D-3) 1000 units CAPS Take 2,000 Units by mouth daily       fish oil-omega-3 fatty acids 1000 MG capsule Take 2 g by mouth daily       fluticasone (FLOVENT DISKUS) 50 MCG/BLIST AEPB Inhale 1 puff into the lungs daily       metoprolol tartrate (LOPRESSOR) 50 MG tablet Take 1 tablet (50 mg) by mouth 2 times daily 180 tablet 3     montelukast (SINGULAIR) 10 MG tablet Take 1 tablet (10 mg) by mouth daily 90 tablet 3     multivitamin, therapeutic with minerals (MULTI-VITAMIN) TABS tablet Take 1 tablet by mouth daily       potassium chloride (K-TAB,KLOR-CON) 10 MEQ tablet Take 10 mEq by mouth daily       valsartan-hydrochlorothiazide (DIOVAN HCT) 320-25 MG tablet Take 1 tablet by mouth daily 90 tablet 3     BP Readings from Last 3 Encounters:   12/18/19 122/70   07/22/19 120/69   01/17/19 108/66    Wt Readings from Last 3 Encounters:   12/18/19 101.7 kg (224 lb 3.2 oz)   07/22/19 102.1 kg (225 lb 1.6 oz)   01/17/19 101.2 kg (223 lb 3.2 oz)                    Reviewed and updated as needed this visit by Provider         Review of Systems   Constitutional,  HEENT, cardiovascular, pulmonary, gi and gu systems are negative, except as otherwise noted.       Objective   Reported vitals:  There were no vitals taken for this visit.     PSYCH: Alert and oriented times 3; coherent speech, normal   rate and volume, able to articulate logical thoughts, able   to abstract reason, no tangential thoughts, no hallucinations   or delusions  Her affect is normal  RESP: No cough, no audible wheezing, able to talk in full sentences  Remainder of exam unable to be completed due to telephone visits            Assessment/Plan:  1. Essential hypertension    - metoprolol tartrate (LOPRESSOR) 50 MG tablet; Take 1 tablet (50 mg) by mouth 2 times daily  Dispense: 180 tablet; Refill: 3  - valsartan-hydrochlorothiazide (DIOVAN HCT) 320-25 MG tablet; Take 1 tablet by mouth daily  Dispense: 90 tablet; Refill: 3  - **ALT FUTURE anytime; Future  - **Basic metabolic panel FUTURE anytime; Future  - **CBC with platelets FUTURE anytime; Future  - Lipid panel reflex to direct LDL Fasting; Future    F/u 6 months and prn      Phone call duration:  8 minutes    Wendy Hicks NP

## 2020-07-08 DIAGNOSIS — I10 ESSENTIAL HYPERTENSION: ICD-10-CM

## 2020-07-08 LAB
ERYTHROCYTE [DISTWIDTH] IN BLOOD BY AUTOMATED COUNT: 12.9 % (ref 10–15)
HCT VFR BLD AUTO: 37.1 % (ref 35–47)
HGB BLD-MCNC: 12.2 G/DL (ref 11.7–15.7)
MCH RBC QN AUTO: 31.5 PG (ref 26.5–33)
MCHC RBC AUTO-ENTMCNC: 32.9 G/DL (ref 31.5–36.5)
MCV RBC AUTO: 96 FL (ref 78–100)
PLATELET # BLD AUTO: 246 10E9/L (ref 150–450)
RBC # BLD AUTO: 3.87 10E12/L (ref 3.8–5.2)
WBC # BLD AUTO: 7.7 10E9/L (ref 4–11)

## 2020-07-08 PROCEDURE — 80061 LIPID PANEL: CPT | Performed by: NURSE PRACTITIONER

## 2020-07-08 PROCEDURE — 85027 COMPLETE CBC AUTOMATED: CPT | Performed by: NURSE PRACTITIONER

## 2020-07-08 PROCEDURE — 36415 COLL VENOUS BLD VENIPUNCTURE: CPT | Performed by: NURSE PRACTITIONER

## 2020-07-08 PROCEDURE — 84460 ALANINE AMINO (ALT) (SGPT): CPT | Performed by: NURSE PRACTITIONER

## 2020-07-08 PROCEDURE — 80048 BASIC METABOLIC PNL TOTAL CA: CPT | Performed by: NURSE PRACTITIONER

## 2020-07-09 LAB
ALT SERPL W P-5'-P-CCNC: 25 U/L (ref 0–50)
ANION GAP SERPL CALCULATED.3IONS-SCNC: 3 MMOL/L (ref 3–14)
BUN SERPL-MCNC: 18 MG/DL (ref 7–30)
CALCIUM SERPL-MCNC: 9 MG/DL (ref 8.5–10.1)
CHLORIDE SERPL-SCNC: 106 MMOL/L (ref 94–109)
CHOLEST SERPL-MCNC: 187 MG/DL
CO2 SERPL-SCNC: 30 MMOL/L (ref 20–32)
CREAT SERPL-MCNC: 0.67 MG/DL (ref 0.52–1.04)
GFR SERPL CREATININE-BSD FRML MDRD: 88 ML/MIN/{1.73_M2}
GLUCOSE SERPL-MCNC: 84 MG/DL (ref 70–99)
HDLC SERPL-MCNC: 50 MG/DL
LDLC SERPL CALC-MCNC: 112 MG/DL
NONHDLC SERPL-MCNC: 137 MG/DL
POTASSIUM SERPL-SCNC: 3.8 MMOL/L (ref 3.4–5.3)
SODIUM SERPL-SCNC: 139 MMOL/L (ref 133–144)
TRIGL SERPL-MCNC: 124 MG/DL

## 2021-01-05 ENCOUNTER — HOSPITAL ENCOUNTER (OUTPATIENT)
Dept: MAMMOGRAPHY | Facility: CLINIC | Age: 71
Discharge: HOME | End: 2021-01-05
Attending: NURSE PRACTITIONER | Admitting: NURSE PRACTITIONER
Payer: MEDICARE

## 2021-01-05 DIAGNOSIS — Z12.31 ENCOUNTER FOR SCREENING MAMMOGRAM FOR BREAST CANCER: ICD-10-CM

## 2021-01-05 PROCEDURE — 77063 BREAST TOMOSYNTHESIS BI: CPT

## 2021-01-14 ENCOUNTER — OFFICE VISIT (OUTPATIENT)
Dept: INTERNAL MEDICINE | Facility: CLINIC | Age: 71
End: 2021-01-14
Payer: MEDICARE

## 2021-01-14 VITALS
DIASTOLIC BLOOD PRESSURE: 68 MMHG | RESPIRATION RATE: 16 BRPM | BODY MASS INDEX: 35.2 KG/M2 | SYSTOLIC BLOOD PRESSURE: 114 MMHG | HEART RATE: 69 BPM | HEIGHT: 67 IN | WEIGHT: 224.3 LBS | OXYGEN SATURATION: 95 % | TEMPERATURE: 97 F

## 2021-01-14 DIAGNOSIS — I10 ESSENTIAL HYPERTENSION, BENIGN: Primary | ICD-10-CM

## 2021-01-14 DIAGNOSIS — E78.5 HYPERLIPIDEMIA LDL GOAL <100: ICD-10-CM

## 2021-01-14 LAB
ERYTHROCYTE [DISTWIDTH] IN BLOOD BY AUTOMATED COUNT: 12.9 % (ref 10–15)
HCT VFR BLD AUTO: 39.8 % (ref 35–47)
HGB BLD-MCNC: 13 G/DL (ref 11.7–15.7)
MCH RBC QN AUTO: 31.6 PG (ref 26.5–33)
MCHC RBC AUTO-ENTMCNC: 32.7 G/DL (ref 31.5–36.5)
MCV RBC AUTO: 97 FL (ref 78–100)
PLATELET # BLD AUTO: 283 10E9/L (ref 150–450)
RBC # BLD AUTO: 4.11 10E12/L (ref 3.8–5.2)
WBC # BLD AUTO: 8.3 10E9/L (ref 4–11)

## 2021-01-14 PROCEDURE — 99213 OFFICE O/P EST LOW 20 MIN: CPT | Performed by: NURSE PRACTITIONER

## 2021-01-14 PROCEDURE — 36415 COLL VENOUS BLD VENIPUNCTURE: CPT | Performed by: NURSE PRACTITIONER

## 2021-01-14 PROCEDURE — 80048 BASIC METABOLIC PNL TOTAL CA: CPT | Performed by: NURSE PRACTITIONER

## 2021-01-14 PROCEDURE — 80061 LIPID PANEL: CPT | Performed by: NURSE PRACTITIONER

## 2021-01-14 PROCEDURE — 84460 ALANINE AMINO (ALT) (SGPT): CPT | Performed by: NURSE PRACTITIONER

## 2021-01-14 PROCEDURE — 85027 COMPLETE CBC AUTOMATED: CPT | Performed by: NURSE PRACTITIONER

## 2021-01-14 ASSESSMENT — MIFFLIN-ST. JEOR: SCORE: 1560.05

## 2021-01-14 NOTE — PROGRESS NOTES
"  Assessment & Plan     Essential hypertension, benign    - CBC with platelets  - Basic metabolic panel    Hyperlipidemia LDL goal <100    - ALT  - Lipid Profile        F/u 6 months               BMI:   Estimated body mass index is 35.13 kg/m  as calculated from the following:    Height as of this encounter: 1.702 m (5' 7\").    Weight as of this encounter: 101.7 kg (224 lb 4.8 oz).             Wendy Hicks NP  [facility] NANO Mukherjee is a 70 year old who presents to clinic today for the following health issues     HPI       Hyperlipidemia Follow-Up      Are you regularly taking any medication or supplement to lower your cholesterol?   Not on any    Are you having muscle aches or other side effects that you think could be caused by your cholesterol lowering medication?  No    Hypertension Follow-up      Do you check your blood pressure regularly outside of the clinic? No     Are you following a low salt diet? Yes    Are your blood pressures ever more than 140 on the top number (systolic) OR more   than 90 on the bottom number (diastolic), for example 140/90? No      How many servings of fruits and vegetables do you eat daily?  4 or more    On average, how many sweetened beverages do you drink each day (Examples: soda, juice, sweet tea, etc.  Do NOT count diet or artificially sweetened beverages)?   0    How many days per week do you exercise enough to make your heart beat faster? 7    How many minutes a day do you exercise enough to make your heart beat faster? 60 or more    How many days per week do you miss taking your medication? 0        Review of Systems   Constitutional, HEENT, cardiovascular, pulmonary, gi and gu systems are negative, except as otherwise noted.      Objective    /68 (BP Location: Right arm, Patient Position: Sitting, Cuff Size: Adult Large)   Pulse 69   Temp 97  F (36.1  C) (Oral)   Resp 16   Ht 1.702 m (5' 7\")   Wt 101.7 kg (224 lb 4.8 " oz)   LMP  (LMP Unknown)   SpO2 95%   BMI 35.13 kg/m    Body mass index is 35.13 kg/m .  Physical Exam   GENERAL: healthy, alert and no distress  NECK: no adenopathy, no asymmetry, masses, or scars and thyroid normal to palpation  RESP: lungs clear to auscultation - no rales, rhonchi or wheezes  CV: regular rate and rhythm, normal S1 S2, no S3 or S4, no murmur, click or rub, no peripheral edema and peripheral pulses strong

## 2021-01-14 NOTE — NURSING NOTE
"Follow  Up on HTN and lipids  RT foot lump inbetween 1st and 2nd toe.  Vital signs:  Temp: 97  F (36.1  C) Temp src: Oral BP: 114/68 Pulse: 69   Resp: 16 SpO2: 95 %     Height: 170.2 cm (5' 7\") Weight: 101.7 kg (224 lb 4.8 oz)  Estimated body mass index is 35.13 kg/m  as calculated from the following:    Height as of this encounter: 1.702 m (5' 7\").    Weight as of this encounter: 101.7 kg (224 lb 4.8 oz).          "

## 2021-01-14 NOTE — LETTER
January 18, 2021      Yaz Rendon  [address] [address]   [place] [address]        Dear ,    We are writing to inform you of your test results.    Your cholesterol is moderately elevated. I would like you to work harder on your diet for now and continue your current medications. You will need a follow up fasting cholesterol in 6 months. The rest of your results are in normal range.     Resulted Orders   CBC with platelets   Result Value Ref Range    WBC 8.3 4.0 - 11.0 10e9/L    RBC Count 4.11 3.8 - 5.2 10e12/L    Hemoglobin 13.0 11.7 - 15.7 g/dL    Hematocrit 39.8 35.0 - 47.0 %    MCV 97 78 - 100 fl    MCH 31.6 26.5 - 33.0 pg    MCHC 32.7 31.5 - 36.5 g/dL    RDW 12.9 10.0 - 15.0 %    Platelet Count 283 150 - 450 10e9/L   Basic metabolic panel   Result Value Ref Range    Sodium 139 133 - 144 mmol/L    Potassium 3.9 3.4 - 5.3 mmol/L    Chloride 105 94 - 109 mmol/L    Carbon Dioxide 30 20 - 32 mmol/L    Anion Gap 4 3 - 14 mmol/L    Glucose 89 70 - 99 mg/dL      Comment:      Fasting specimen    Urea Nitrogen 21 7 - 30 mg/dL    Creatinine 0.77 0.52 - 1.04 mg/dL    GFR Estimate 77 >60 mL/min/[1.73_m2]      Comment:      Non  GFR Calc  Starting 12/18/2018, serum creatinine based estimated GFR (eGFR) will be   calculated using the Chronic Kidney Disease Epidemiology Collaboration   (CKD-EPI) equation.      GFR Estimate If Black 89 >60 mL/min/[1.73_m2]      Comment:       GFR Calc  Starting 12/18/2018, serum creatinine based estimated GFR (eGFR) will be   calculated using the Chronic Kidney Disease Epidemiology Collaboration   (CKD-EPI) equation.      Calcium 9.2 8.5 - 10.1 mg/dL   ALT   Result Value Ref Range    ALT 31 0 - 50 U/L   Lipid Profile   Result Value Ref Range    Cholesterol 222 (H) <200 mg/dL      Comment:      Desirable:       <200 mg/dl    Triglycerides 122 <150 mg/dL      Comment:      Fasting specimen    HDL Cholesterol 58 >49 mg/dL    LDL  Cholesterol Calculated 140 (H) <100 mg/dL      Comment:      Above desirable:  100-129 mg/dl  Borderline High:  130-159 mg/dL  High:             160-189 mg/dL  Very high:       >189 mg/dl      Non HDL Cholesterol 164 (H) <130 mg/dL      Comment:      Above Desirable:  130-159 mg/dl  Borderline high:  160-189 mg/dl  High:             190-219 mg/dl  Very high:       >219 mg/dl         If you have any questions or concerns, please call the clinic at the number listed above.       Sincerely,      Wendy Hicks NP

## 2021-01-15 LAB
ALT SERPL W P-5'-P-CCNC: 31 U/L (ref 0–50)
ANION GAP SERPL CALCULATED.3IONS-SCNC: 4 MMOL/L (ref 3–14)
BUN SERPL-MCNC: 21 MG/DL (ref 7–30)
CALCIUM SERPL-MCNC: 9.2 MG/DL (ref 8.5–10.1)
CHLORIDE SERPL-SCNC: 105 MMOL/L (ref 94–109)
CHOLEST SERPL-MCNC: 222 MG/DL
CO2 SERPL-SCNC: 30 MMOL/L (ref 20–32)
CREAT SERPL-MCNC: 0.77 MG/DL (ref 0.52–1.04)
GFR SERPL CREATININE-BSD FRML MDRD: 77 ML/MIN/{1.73_M2}
GLUCOSE SERPL-MCNC: 89 MG/DL (ref 70–99)
HDLC SERPL-MCNC: 58 MG/DL
LDLC SERPL CALC-MCNC: 140 MG/DL
NONHDLC SERPL-MCNC: 164 MG/DL
POTASSIUM SERPL-SCNC: 3.9 MMOL/L (ref 3.4–5.3)
SODIUM SERPL-SCNC: 139 MMOL/L (ref 133–144)
TRIGL SERPL-MCNC: 122 MG/DL

## 2021-03-07 ENCOUNTER — HEALTH MAINTENANCE LETTER (OUTPATIENT)
Age: 71
End: 2021-03-07

## 2021-07-11 DIAGNOSIS — I10 ESSENTIAL HYPERTENSION: ICD-10-CM

## 2021-07-12 RX ORDER — METOPROLOL TARTRATE 50 MG
TABLET ORAL
Qty: 180 TABLET | Refills: 1 | Status: SHIPPED | OUTPATIENT
Start: 2021-07-12 | End: 2022-01-06

## 2021-07-12 RX ORDER — VALSARTAN AND HYDROCHLOROTHIAZIDE 320; 25 MG/1; MG/1
TABLET, FILM COATED ORAL
Qty: 90 TABLET | Refills: 1 | Status: SHIPPED | OUTPATIENT
Start: 2021-07-12 | End: 2021-12-30

## 2021-07-12 NOTE — TELEPHONE ENCOUNTER
Pending Prescriptions:                       Disp   Refills    metoprolol tartrate (LOPRESSOR) 50 MG tab*180 ta*1            Sig: TAKE 1 TABLET BY MOUTH TWICE A DAY    valsartan-hydrochlorothiazide (DIOVAN HCT*90 tab*1            Sig: TAKE 1 TABLET BY MOUTH EVERY DAY    Prescription approved per [facility] Refill Protocol.

## 2021-10-10 ENCOUNTER — HEALTH MAINTENANCE LETTER (OUTPATIENT)
Age: 71
End: 2021-10-10

## 2022-02-03 ASSESSMENT — ENCOUNTER SYMPTOMS
ARTHRALGIAS: 0
CHILLS: 0
COUGH: 0
NAUSEA: 0
PARESTHESIAS: 0
DIZZINESS: 0
HEMATURIA: 0
ABDOMINAL PAIN: 0
PALPITATIONS: 0
EYE PAIN: 0
NERVOUS/ANXIOUS: 0
DYSURIA: 0
SHORTNESS OF BREATH: 0
FEVER: 0
DIARRHEA: 0
HEMATOCHEZIA: 0
BREAST MASS: 0
FREQUENCY: 0
SORE THROAT: 0
HEADACHES: 0
CONSTIPATION: 0
MYALGIAS: 0
JOINT SWELLING: 0
HEARTBURN: 0
WEAKNESS: 0

## 2022-02-03 ASSESSMENT — ACTIVITIES OF DAILY LIVING (ADL): CURRENT_FUNCTION: NO ASSISTANCE NEEDED

## 2022-02-07 ENCOUNTER — HOSPITAL ENCOUNTER (OUTPATIENT)
Dept: MAMMOGRAPHY | Facility: CLINIC | Age: 72
Discharge: HOME | End: 2022-02-07
Attending: NURSE PRACTITIONER | Admitting: NURSE PRACTITIONER
Payer: MEDICARE

## 2022-02-07 ENCOUNTER — OFFICE VISIT (OUTPATIENT)
Dept: INTERNAL MEDICINE | Facility: CLINIC | Age: 72
End: 2022-02-07
Payer: MEDICARE

## 2022-02-07 VITALS
DIASTOLIC BLOOD PRESSURE: 70 MMHG | OXYGEN SATURATION: 95 % | SYSTOLIC BLOOD PRESSURE: 122 MMHG | WEIGHT: 225.1 LBS | HEART RATE: 85 BPM | BODY MASS INDEX: 35.33 KG/M2 | HEIGHT: 67 IN | RESPIRATION RATE: 16 BRPM | TEMPERATURE: 97 F

## 2022-02-07 DIAGNOSIS — E66.01 MORBID OBESITY (H): ICD-10-CM

## 2022-02-07 DIAGNOSIS — Z12.31 VISIT FOR SCREENING MAMMOGRAM: ICD-10-CM

## 2022-02-07 DIAGNOSIS — J30.2 SEASONAL ALLERGIC RHINITIS, UNSPECIFIED TRIGGER: ICD-10-CM

## 2022-02-07 DIAGNOSIS — Z00.00 HEALTHCARE MAINTENANCE: Primary | ICD-10-CM

## 2022-02-07 DIAGNOSIS — I10 PRIMARY HYPERTENSION: ICD-10-CM

## 2022-02-07 LAB
ANION GAP SERPL CALCULATED.3IONS-SCNC: 4 MMOL/L (ref 3–14)
BUN SERPL-MCNC: 17 MG/DL (ref 7–30)
CALCIUM SERPL-MCNC: 9.3 MG/DL (ref 8.5–10.1)
CHLORIDE BLD-SCNC: 107 MMOL/L (ref 94–109)
CHOLEST SERPL-MCNC: 209 MG/DL
CO2 SERPL-SCNC: 30 MMOL/L (ref 20–32)
CREAT SERPL-MCNC: 0.67 MG/DL (ref 0.52–1.04)
ERYTHROCYTE [DISTWIDTH] IN BLOOD BY AUTOMATED COUNT: 13.2 % (ref 10–15)
FASTING STATUS PATIENT QL REPORTED: YES
GFR SERPL CREATININE-BSD FRML MDRD: >90 ML/MIN/1.73M2
GLUCOSE BLD-MCNC: 99 MG/DL (ref 70–99)
HCT VFR BLD AUTO: 38.9 % (ref 35–47)
HDLC SERPL-MCNC: 60 MG/DL
HGB BLD-MCNC: 12.5 G/DL (ref 11.7–15.7)
LDLC SERPL CALC-MCNC: 111 MG/DL
MCH RBC QN AUTO: 30.9 PG (ref 26.5–33)
MCHC RBC AUTO-ENTMCNC: 32.1 G/DL (ref 31.5–36.5)
MCV RBC AUTO: 96 FL (ref 78–100)
NONHDLC SERPL-MCNC: 149 MG/DL
PLATELET # BLD AUTO: 241 10E3/UL (ref 150–450)
POTASSIUM BLD-SCNC: 4.2 MMOL/L (ref 3.4–5.3)
RBC # BLD AUTO: 4.05 10E6/UL (ref 3.8–5.2)
SODIUM SERPL-SCNC: 141 MMOL/L (ref 133–144)
TRIGL SERPL-MCNC: 189 MG/DL
WBC # BLD AUTO: 7.8 10E3/UL (ref 4–11)

## 2022-02-07 PROCEDURE — 80061 LIPID PANEL: CPT | Performed by: NURSE PRACTITIONER

## 2022-02-07 PROCEDURE — 77067 SCR MAMMO BI INCL CAD: CPT

## 2022-02-07 PROCEDURE — 85027 COMPLETE CBC AUTOMATED: CPT | Performed by: NURSE PRACTITIONER

## 2022-02-07 PROCEDURE — 36415 COLL VENOUS BLD VENIPUNCTURE: CPT | Performed by: NURSE PRACTITIONER

## 2022-02-07 PROCEDURE — 99397 PER PM REEVAL EST PAT 65+ YR: CPT | Performed by: NURSE PRACTITIONER

## 2022-02-07 PROCEDURE — 80048 BASIC METABOLIC PNL TOTAL CA: CPT | Performed by: NURSE PRACTITIONER

## 2022-02-07 RX ORDER — MONTELUKAST SODIUM 10 MG/1
10 TABLET ORAL DAILY
Qty: 90 TABLET | Refills: 3 | Status: SHIPPED | OUTPATIENT
Start: 2022-02-07

## 2022-02-07 ASSESSMENT — ENCOUNTER SYMPTOMS
MYALGIAS: 0
HEMATOCHEZIA: 0
BREAST MASS: 0
SHORTNESS OF BREATH: 0
WEAKNESS: 0
FEVER: 0
HEARTBURN: 0
DIARRHEA: 0
PALPITATIONS: 0
NAUSEA: 0
NERVOUS/ANXIOUS: 0
EYE PAIN: 0
DIZZINESS: 0
PARESTHESIAS: 0
HEADACHES: 0
COUGH: 0
DYSURIA: 0
HEMATURIA: 0
ARTHRALGIAS: 0
SORE THROAT: 0
FREQUENCY: 0
CONSTIPATION: 0
JOINT SWELLING: 0
ABDOMINAL PAIN: 0
CHILLS: 0

## 2022-02-07 ASSESSMENT — ACTIVITIES OF DAILY LIVING (ADL): CURRENT_FUNCTION: NO ASSISTANCE NEEDED

## 2022-02-07 ASSESSMENT — MIFFLIN-ST. JEOR: SCORE: 1558.68

## 2022-02-07 NOTE — PROGRESS NOTES
"   SUBJECTIVE:   CC: Yaz Rendon is an 71 year old woman who presents for preventive health visit.       Patient has been advised of split billing requirements and indicates understanding: Yes  Healthy Habits:     In general, how would you rate your overall health?  Good    Frequency of exercise:  4-5 days/week    Duration of exercise:  45-60 minutes    Do you usually eat at least 4 servings of fruit and vegetables a day, include whole grains    & fiber and avoid regularly eating high fat or \"junk\" foods?  Yes    Taking medications regularly:  Yes    Medication side effects:  None    Ability to successfully perform activities of daily living:  No assistance needed    Home Safety:  No safety concerns identified    Hearing Impairment:  No hearing concerns    In the past 6 months, have you been bothered by leaking of urine?  No    In general, how would you rate your overall mental or emotional health?  Good      PHQ-2 Total Score: 0    Additional concerns today:  No          Hypertension Follow-up      Do you check your blood pressure regularly outside of the clinic? No     Are you following a low salt diet? No    Are your blood pressures ever more than 140 on the top number (systolic) OR more   than 90 on the bottom number (diastolic), for example 140/90? na      Today's PHQ-2 Score:   PHQ-2 ( 1999 [employer]) 2/3/2022   Q1: Little interest or pleasure in doing things 0   Q2: Feeling down, depressed or hopeless 0   PHQ-2 Score 0   PHQ-2 Total Score (12-17 Years)- Positive if 3 or more points; Administer PHQ-A if positive -   Q1: Little interest or pleasure in doing things Not at all   Q2: Feeling down, depressed or hopeless Not at all   PHQ-2 Score 0       Abuse: Current or Past (Physical, Sexual or Emotional) - No  Do you feel safe in your environment? Yes        Social History     Tobacco Use     Smoking status: Former Smoker     Smokeless tobacco: Never Used   Substance Use Topics     Alcohol use: Yes     Comment: " occasional     If you drink alcohol do you typically have >3 drinks per day or >7 drinks per week? No    Alcohol Use 2/3/2022   Prescreen: >3 drinks/day or >7 drinks/week? No   Prescreen: >3 drinks/day or >7 drinks/week? -       Reviewed orders with patient.  Reviewed health maintenance and updated orders accordingly - Yes  BP Readings from Last 3 Encounters:   02/07/22 122/70   01/14/21 114/68   12/18/19 122/70    Wt Readings from Last 3 Encounters:   02/07/22 102.1 kg (225 lb 1.6 oz)   01/14/21 101.7 kg (224 lb 4.8 oz)   12/18/19 101.7 kg (224 lb 3.2 oz)                  Patient Active Problem List   Diagnosis     Hypertension     Morbid obesity (H)     Past Surgical History:   Procedure Laterality Date     ABDOMEN SURGERY      2 c-sections     ORTHOPEDIC SURGERY      bilateral knee arthroscopic       Social History     Tobacco Use     Smoking status: Former Smoker     Smokeless tobacco: Never Used   Substance Use Topics     Alcohol use: Yes     Comment: occasional     Family History   Problem Relation Age of Onset     Dementia Mother      Hypertension Father      Coronary Artery Disease Early Onset Brother          Current Outpatient Medications   Medication Sig Dispense Refill     aspirin 81 MG tablet Take 81 mg by mouth daily       Cholecalciferol (VITAMIN D-3) 1000 units CAPS Take 2,000 Units by mouth daily       fish oil-omega-3 fatty acids 1000 MG capsule Take 2 g by mouth daily       fluticasone (FLOVENT DISKUS) 50 MCG/BLIST inhaler Inhale 1 puff into the lungs daily 60 each 1     metoprolol tartrate (LOPRESSOR) 50 MG tablet TAKE 1 TABLET BY MOUTH TWICE A  tablet 0     montelukast (SINGULAIR) 10 MG tablet Take 1 tablet (10 mg) by mouth daily 90 tablet 3     multivitamin, therapeutic with minerals (MULTI-VITAMIN) TABS tablet Take 1 tablet by mouth daily       potassium chloride (K-TAB,KLOR-CON) 10 MEQ tablet Take 10 mEq by mouth daily       valsartan-hydrochlorothiazide (DIOVAN HCT) 320-25 MG tablet  "TAKE 1 TABLET BY MOUTH EVERY DAY 90 tablet 0       Breast Cancer Screening:        History of abnormal Pap smear: na     Reviewed and updated as needed this visit by clinical staff  Tobacco  Allergies  Meds   Med Hx  Surg Hx  Fam Hx  Soc Hx       Reviewed and updated as needed this visit by Provider                   Review of Systems   Constitutional: Negative for chills and fever.   HENT: Negative for congestion, ear pain, hearing loss and sore throat.    Eyes: Negative for pain and visual disturbance.   Respiratory: Negative for cough and shortness of breath.    Cardiovascular: Negative for chest pain, palpitations and peripheral edema.   Gastrointestinal: Negative for abdominal pain, constipation, diarrhea, heartburn, hematochezia and nausea.   Breasts:  Negative for tenderness, breast mass and discharge.   Genitourinary: Negative for dysuria, frequency, genital sores, hematuria, pelvic pain, urgency, vaginal bleeding and vaginal discharge.   Musculoskeletal: Negative for arthralgias, joint swelling and myalgias.   Skin: Negative for rash.   Neurological: Negative for dizziness, weakness, headaches and paresthesias.   Psychiatric/Behavioral: Negative for mood changes. The patient is not nervous/anxious.           OBJECTIVE:   /70   Pulse 85   Temp 97  F (36.1  C) (Tympanic)   Resp 16   Ht 1.702 m (5' 7\")   Wt 102.1 kg (225 lb 1.6 oz)   LMP  (LMP Unknown)   SpO2 95%   BMI 35.26 kg/m    Physical Exam  GENERAL: alert, no distress and obese  NECK: no adenopathy, no asymmetry, masses, or scars and thyroid normal to palpation  RESP: lungs clear to auscultation - no rales, rhonchi or wheezes  CV: regular rate and rhythm, normal S1 S2, no S3 or S4, no murmur, click or rub, no peripheral edema and peripheral pulses strong  ABDOMEN: soft, nontender, no hepatosplenomegaly, no masses and bowel sounds normal  MS: no gross musculoskeletal defects noted, no edema  PSYCH: mentation appears normal, affect " "normal/bright        ASSESSMENT/PLAN:       ICD-10-CM    1. Healthcare maintenance  Z00.00 CBC with platelets     Basic metabolic panel  (Ca, Cl, CO2, Creat, Gluc, K, Na, BUN)     Lipid panel reflex to direct LDL Fasting     CBC with platelets     Basic metabolic panel  (Ca, Cl, CO2, Creat, Gluc, K, Na, BUN)     Lipid panel reflex to direct LDL Fasting   2. Seasonal allergic rhinitis, unspecified trigger  J30.2 fluticasone (FLOVENT DISKUS) 50 MCG/BLIST inhaler     montelukast (SINGULAIR) 10 MG tablet   3. Morbid obesity (H)  E66.01    4. Primary hypertension  I10        Patient has been advised of split billing requirements and indicates understanding: Yes    COUNSELING:  Reviewed preventive health counseling, as reflected in patient instructions    Estimated body mass index is 35.26 kg/m  as calculated from the following:    Height as of this encounter: 1.702 m (5' 7\").    Weight as of this encounter: 102.1 kg (225 lb 1.6 oz).    Weight management plan: Discussed healthy diet and exercise guidelines    She reports that she has quit smoking. She has never used smokeless tobacco.      Counseling Resources:  ATP IV Guidelines  Pooled Cohorts Equation Calculator  Breast Cancer Risk Calculator  BRCA-Related Cancer Risk Assessment: FHS-7 Tool  FRAX Risk Assessment  ICSI Preventive Guidelines  Dietary Guidelines for Americans, 2010  USDA's MyPlate  ASA Prophylaxis  Lung CA Screening    Wendy Hicks NP  [facility]  "

## 2022-02-07 NOTE — NURSING NOTE
"Physical- fasting.  Vital signs:  Temp: 97  F (36.1  C) Temp src: Tympanic BP: 122/70 Pulse: 85   Resp: 16 SpO2: 95 %     Height: 170.2 cm (5' 7\") Weight: 102.1 kg (225 lb 1.6 oz)  Estimated body mass index is 35.26 kg/m  as calculated from the following:    Height as of this encounter: 1.702 m (5' 7\").    Weight as of this encounter: 102.1 kg (225 lb 1.6 oz).          "

## 2022-02-09 ENCOUNTER — TELEPHONE (OUTPATIENT)
Dept: INTERNAL MEDICINE | Facility: CLINIC | Age: 72
End: 2022-02-09
Payer: MEDICARE

## 2022-02-09 DIAGNOSIS — J30.2 SEASONAL ALLERGIC RHINITIS, UNSPECIFIED TRIGGER: Primary | ICD-10-CM

## 2022-02-09 RX ORDER — FLUTICASONE PROPIONATE 220 UG/1
1 AEROSOL, METERED RESPIRATORY (INHALATION) 2 TIMES DAILY
Qty: 12 G | Refills: 3 | Status: SHIPPED | OUTPATIENT
Start: 2022-02-09

## 2022-02-09 NOTE — TELEPHONE ENCOUNTER
Message faxed from pharmacy saying insurance prefers pulmicort instead of flovent. Please send new rx if able.

## 2022-03-30 DIAGNOSIS — I10 ESSENTIAL HYPERTENSION: ICD-10-CM

## 2022-03-30 RX ORDER — METOPROLOL TARTRATE 50 MG
TABLET ORAL
Qty: 180 TABLET | Refills: 3 | Status: SHIPPED | OUTPATIENT
Start: 2022-03-30

## 2022-03-30 RX ORDER — VALSARTAN AND HYDROCHLOROTHIAZIDE 320; 25 MG/1; MG/1
TABLET, FILM COATED ORAL
Qty: 90 TABLET | Refills: 3 | Status: SHIPPED | OUTPATIENT
Start: 2022-03-30

## 2022-09-18 ENCOUNTER — HEALTH MAINTENANCE LETTER (OUTPATIENT)
Age: 72
End: 2022-09-18

## 2022-10-11 ENCOUNTER — TELEPHONE (OUTPATIENT)
Dept: INTERNAL MEDICINE | Facility: CLINIC | Age: 72
End: 2022-10-11

## 2022-10-14 DIAGNOSIS — J30.2 SEASONAL ALLERGIC RHINITIS, UNSPECIFIED TRIGGER: ICD-10-CM

## 2022-10-14 DIAGNOSIS — I10 ESSENTIAL HYPERTENSION: ICD-10-CM

## 2022-10-18 RX ORDER — MONTELUKAST SODIUM 10 MG/1
10 TABLET ORAL DAILY
Qty: 90 TABLET | Refills: 3 | OUTPATIENT
Start: 2022-10-18

## 2022-10-18 RX ORDER — VALSARTAN AND HYDROCHLOROTHIAZIDE 320; 25 MG/1; MG/1
TABLET, FILM COATED ORAL
Qty: 90 TABLET | Refills: 3 | OUTPATIENT
Start: 2022-10-18

## 2022-10-18 RX ORDER — METOPROLOL TARTRATE 50 MG
TABLET ORAL
Qty: 180 TABLET | Refills: 3 | OUTPATIENT
Start: 2022-10-18

## 2023-05-06 ENCOUNTER — HEALTH MAINTENANCE LETTER (OUTPATIENT)
Age: 73
End: 2023-05-06

## 2024-07-14 ENCOUNTER — HEALTH MAINTENANCE LETTER (OUTPATIENT)
Age: 74
End: 2024-07-14